# Patient Record
Sex: FEMALE
[De-identification: names, ages, dates, MRNs, and addresses within clinical notes are randomized per-mention and may not be internally consistent; named-entity substitution may affect disease eponyms.]

---

## 2023-08-02 PROBLEM — Z00.00 ENCOUNTER FOR PREVENTIVE HEALTH EXAMINATION: Status: ACTIVE | Noted: 2023-08-02

## 2023-08-04 ENCOUNTER — APPOINTMENT (OUTPATIENT)
Dept: UROLOGY | Facility: CLINIC | Age: 43
End: 2023-08-04
Payer: MEDICAID

## 2023-08-04 VITALS
HEART RATE: 96 BPM | DIASTOLIC BLOOD PRESSURE: 77 MMHG | SYSTOLIC BLOOD PRESSURE: 110 MMHG | RESPIRATION RATE: 16 BRPM | WEIGHT: 175 LBS | OXYGEN SATURATION: 96 % | BODY MASS INDEX: 24.5 KG/M2 | HEIGHT: 71 IN

## 2023-08-04 DIAGNOSIS — K21.9 GASTRO-ESOPHAGEAL REFLUX DISEASE W/OUT ESOPHAGITIS: ICD-10-CM

## 2023-08-04 PROCEDURE — 99203 OFFICE O/P NEW LOW 30 MIN: CPT

## 2023-08-04 NOTE — ASSESSMENT
[FreeTextEntry1] : 42 year old with neurogenic bladder after T11-12 spinal cord injury managed with SP tube, patient changes on her own. No symptomatic UTIs, recent bladder stone s/p laser lithotripsy. Had recent incomplete UDS. Reports had imaging in January for unrelated causes and kidneys were wnl, and recent labwork for preop. Also hsitory endometriosis for which she is undergoing TAHBSO end of month. Discussed referral to Dr. Marcelo for further evaluation and management.  - referral to [facility] for further management - patient to bring prior MRI and labs so has baseline kidney imaging and function on file - will likely need to complete UDS - email sent to Dr. Marcelo and team

## 2023-08-04 NOTE — LETTER BODY
[Dear  ___] : Dear  [unfilled], [Courtesy Letter:] : I had the pleasure of seeing your patient, [unfilled], in my office today. [Please see my note below.] : Please see my note below. [Referral Closing:] : Thank you very much for seeing this patient.  If you have any questions, please do not hesitate to contact me. [Sincerely,] : Sincerely, [FreeTextEntry3] : Frances Ybarra MD Director of Robotic Education [facility] at [facility]  [email or web address] [phone] ([place]) [phone]  ([facility])

## 2023-08-04 NOTE — PHYSICAL EXAM
[General Appearance - Well Developed] : well developed [General Appearance - Well Nourished] : well nourished [Normal Appearance] : normal appearance [Well Groomed] : well groomed [General Appearance - In No Acute Distress] : no acute distress [] : no respiratory distress [Respiration, Rhythm And Depth] : normal respiratory rhythm and effort [Exaggerated Use Of Accessory Muscles For Inspiration] : no accessory muscle use [Abdomen Soft] : soft [Abdomen Tenderness] : non-tender [Costovertebral Angle Tenderness] : no ~M costovertebral angle tenderness [FreeTextEntry1] : SP tube in place draining clear urine, mild granulation tissue around, well healed [Normal Station and Gait] : the gait and station were normal for the patient's age

## 2023-08-04 NOTE — HISTORY OF PRESENT ILLNESS
[FreeTextEntry1] : Name GENNARO DURON  MRN [id number]   Aug 16 1980  Contact Number: [phone] ----------------------------------------------------------------------------------------------------------------------------------------- Date of First Visit: 23 Referring Provider/PCP: Dr. Kiran Walsh -----------------------------------------------------------------------------------------------------------------------------------------  CC: neurogenic bladder/second opinion  History of Present Illness: GENNARO DURON is a 42 year old female who presents for second opinion regarding urodynamic findings.  Patient had traumatic fall 2020 with resultant T11-12 spinal cord injury. Neurogenic bladder that has been managed with suprapubic tube that patient changes on her own. She reports had SP placed around that time but then never did UDS until  - incomplete study, and patient did not like urologist's office. At that time cystoscopy performed and stone seen. 2023 underwent laser lithotripsy of stone. Patient would like to reestablish care.  Prior to this patient had history of IC and pelvic floor issues - frequency, urgency, pelvic pain. Did pelvic floor PT 5 years ago. Has endometriosis and had excision surgery. Now planning for TAHBSO 23. Patient had MRI in 2023 for GERD symptoms - was found to have ovarian cysts and kidneys reportedly normal. Did bloodwork prior lithotripsy as well.  23: cystoscopy revealed bladder stone, no other abnormalities noted.  No frequent symptomatic UTIs. No history febrile UTIs or pyelo. Patient changed SP tube on her own. No reported issues - had frequent clogging before lasering of stone.   PMH: paraplegia T11/12 complete SCI, endometriosis, DVT PSH: right leg amputation, endometriosis excision, appendectomy, spinal surgery Family History: colon cancer paternal grandfather Social: partner, no children, ex-smoker 15 years 1/2ppd, social alcohol, +marijuana daily Meds: northedrdine, protonix, eliquis, lyrica, seroquel, flexeril, vaginal valium, magnesium, omega, probiotic, vitamins Allergies: vancomycin ROS: no fevers, chills, flank pain

## 2023-08-15 ENCOUNTER — APPOINTMENT (OUTPATIENT)
Dept: UROLOGY | Facility: CLINIC | Age: 43
End: 2023-08-15
Payer: MEDICAID

## 2023-08-15 VITALS
SYSTOLIC BLOOD PRESSURE: 122 MMHG | DIASTOLIC BLOOD PRESSURE: 81 MMHG | TEMPERATURE: 97.8 F | HEART RATE: 88 BPM | OXYGEN SATURATION: 99 %

## 2023-08-15 DIAGNOSIS — Z78.9 OTHER SPECIFIED HEALTH STATUS: ICD-10-CM

## 2023-08-15 DIAGNOSIS — Z87.891 PERSONAL HISTORY OF NICOTINE DEPENDENCE: ICD-10-CM

## 2023-08-15 DIAGNOSIS — Z80.0 FAMILY HISTORY OF MALIGNANT NEOPLASM OF DIGESTIVE ORGANS: ICD-10-CM

## 2023-08-15 PROCEDURE — 99215 OFFICE O/P EST HI 40 MIN: CPT

## 2023-08-16 NOTE — PHYSICAL EXAM
[General Appearance - Well Developed] : well developed [General Appearance - Well Nourished] : well nourished [Normal Appearance] : normal appearance [Well Groomed] : well groomed [General Appearance - In No Acute Distress] : no acute distress [Edema] : no peripheral edema [] : no respiratory distress [Respiration, Rhythm And Depth] : normal respiratory rhythm and effort [Exaggerated Use Of Accessory Muscles For Inspiration] : no accessory muscle use [Abdomen Soft] : soft [Abdomen Tenderness] : non-tender [Costovertebral Angle Tenderness] : no ~M costovertebral angle tenderness [Oriented To Time, Place, And Person] : oriented to person, place, and time [Affect] : the affect was normal [Mood] : the mood was normal [Not Anxious] : not anxious [FreeTextEntry1] : Right leg amputation and uses power wheelchair

## 2023-08-16 NOTE — HISTORY OF PRESENT ILLNESS
[FreeTextEntry1] : 42 year old  female w/ PMH of DVT on Eliquis, neurogenic bladder (s/p SVI after suicide attempt) and endometriosis accompanied by caretaker/boyfriend Jose here for UDS. She reports has suprapubic catheter in place due to spinal cord injury to T11-12 about 4 years ago. She reports changes suprapubic catheter every 4 weeks. However, she verbalizes that this year, , suprapubic catheter would get clogged which warranted further workup and therefore had UDS and cystoscopy. UDS was incomplete as she could not hold more than 200ml and bladder stone was found during cystoscopy. Bladder stone was removed via laser lithotripsy. She reports still having sediments and clogs and has been changing her catheter more frequently. She changed suprapubic catheter 5 days ago and has new suprapubic catheter today.   She verbalizes has endometriosis and is scheduled for TAHBSO on Aug 28 2023 and would like to repeat UDS study after her surgery.   PMH: endometriosis, neurogenic bladder, b/l DVT  Surghx: spinal fusion  and , endometrial excision , IVC filter, right leg BKA, suprapubic catheter since  FH: paternal grandfather colon cancer; no  malignancy Social: former smoker, quit , uses marijuana, social ETOH,  Medications: protonix, progestin only birth control, Eliquis, Lyrica, Seroquel, Flexeril, vaginal valium suppository Allergies: vancomycin

## 2023-08-16 NOTE — ASSESSMENT
[FreeTextEntry1] :   Impression/Plan: 42 year old s/p SCI with neurogenic bladder with suprapubic catheter in place. Had incomplete UDS in 2023. Issue with clogging of SPT.   -advised can irrigate bladder BID with saline or can upsize catheter -can consider other long-term options of diversion (continent vs inconinent) keeping suprapubic catheter -advised to send previous UDS records to this office   Referred to [facility] in [place] for UDS with video study in November 2023 after hysterectomy as baseline study.   I, Dr. Rasheed Marcelo, personally performed the evaluation and management (E/M) services for this new patient.  That E/M includes conducting the clinically appropriate initial history &/or exam, assessing all conditions, and establishing the plan of care.  Today, my GUIDO Ann, was here to observe &/or participate in the visit & follow plan of care established by me.

## 2023-08-22 ENCOUNTER — APPOINTMENT (OUTPATIENT)
Dept: UROLOGY | Facility: CLINIC | Age: 43
End: 2023-08-22
Payer: MEDICAID

## 2023-08-22 PROCEDURE — 99441: CPT

## 2023-08-23 NOTE — HISTORY OF PRESENT ILLNESS
Problem: Patient/Family Goals  Goal: Patient/Family Long Term Goal  Description  Patient's Long Term Goal: DISCHARGE HOME    Interventions:  - PAIN TOLERABLE  -TOLERATING DIET  -RETURN TO PREVIOUS ADL'S  - See additional Care Plan goals for specific inte [Other Location: e.g. School (Enter Location, City,State)___] : at [unfilled], at the time of the visit. preferences  - Enhance eating environment  Outcome: Progressing  Goal: Achieves appropriate nutritional intake (bariatric)  Description  INTERVENTIONS:  - Monitor for over-consumption  - Identify factors contributing to increased intake, treat as appropria [Home] : at home, [unfilled] , at the time of the visit. severity of pain and evaluate response  - Implement non-pharmacological measures as appropriate and evaluate response  - Consider cultural and social influences on pain and pain management  - Manage/alleviate anxiety  - Utilize distraction and/or relaxatio [Medical Office: ([facility])___] : at the medical office located in  [Verbal consent obtained from patient] : the patient, [unfilled] [FreeTextEntry1] :  Patient had more questions about condition, potential options.  She asked about sediment, role for abx, discussed pros and cons, will hold off. She will have a VUDS at [facility]  in coming weeks.

## 2023-12-28 ENCOUNTER — APPOINTMENT (OUTPATIENT)
Dept: UROLOGY | Facility: CLINIC | Age: 43
End: 2023-12-28
Payer: MEDICAID

## 2023-12-28 ENCOUNTER — TRANSCRIPTION ENCOUNTER (OUTPATIENT)
Age: 43
End: 2023-12-28

## 2023-12-28 ENCOUNTER — OUTPATIENT (OUTPATIENT)
Dept: OUTPATIENT SERVICES | Facility: HOSPITAL | Age: 43
LOS: 1 days | End: 2023-12-28
Payer: MEDICAID

## 2023-12-28 VITALS
HEART RATE: 95 BPM | TEMPERATURE: 98 F | DIASTOLIC BLOOD PRESSURE: 85 MMHG | SYSTOLIC BLOOD PRESSURE: 132 MMHG | OXYGEN SATURATION: 94 %

## 2023-12-28 DIAGNOSIS — R35.0 FREQUENCY OF MICTURITION: ICD-10-CM

## 2023-12-28 DIAGNOSIS — R33.9 RETENTION OF URINE, UNSPECIFIED: ICD-10-CM

## 2023-12-28 DIAGNOSIS — N31.9 NEUROMUSCULAR DYSFUNCTION OF BLADDER, UNSPECIFIED: ICD-10-CM

## 2023-12-28 PROCEDURE — 51797 INTRAABDOMINAL PRESSURE TEST: CPT | Mod: 26

## 2023-12-28 PROCEDURE — 51728 CYSTOMETROGRAM W/VP: CPT | Mod: 26

## 2023-12-28 PROCEDURE — 51784 ANAL/URINARY MUSCLE STUDY: CPT | Mod: 26

## 2023-12-28 PROCEDURE — 51784 ANAL/URINARY MUSCLE STUDY: CPT

## 2023-12-28 PROCEDURE — 51600 INJECTION FOR BLADDER X-RAY: CPT

## 2023-12-28 PROCEDURE — 74455 X-RAY URETHRA/BLADDER: CPT | Mod: 59

## 2023-12-28 PROCEDURE — 51797 INTRAABDOMINAL PRESSURE TEST: CPT

## 2023-12-28 PROCEDURE — 51741 ELECTRO-UROFLOWMETRY FIRST: CPT | Mod: 26

## 2023-12-28 PROCEDURE — 51741 ELECTRO-UROFLOWMETRY FIRST: CPT

## 2023-12-28 PROCEDURE — 52000 CYSTOURETHROSCOPY: CPT

## 2023-12-28 PROCEDURE — 51728 CYSTOMETROGRAM W/VP: CPT

## 2023-12-28 PROCEDURE — 74455 X-RAY URETHRA/BLADDER: CPT | Mod: 26,59

## 2023-12-29 DIAGNOSIS — N31.9 NEUROMUSCULAR DYSFUNCTION OF BLADDER, UNSPECIFIED: ICD-10-CM

## 2023-12-29 DIAGNOSIS — R33.9 RETENTION OF URINE, UNSPECIFIED: ICD-10-CM

## 2025-08-19 ENCOUNTER — APPOINTMENT (OUTPATIENT)
Dept: UROLOGY | Facility: CLINIC | Age: 45
End: 2025-08-19

## 2025-09-02 ENCOUNTER — APPOINTMENT (OUTPATIENT)
Dept: UROLOGY | Facility: CLINIC | Age: 45
End: 2025-09-02
Payer: MEDICAID

## 2025-09-02 VITALS
HEIGHT: 71 IN | WEIGHT: 175 LBS | RESPIRATION RATE: 91 BRPM | BODY MASS INDEX: 24.5 KG/M2 | TEMPERATURE: 98.4 F | OXYGEN SATURATION: 98 % | DIASTOLIC BLOOD PRESSURE: 84 MMHG | SYSTOLIC BLOOD PRESSURE: 123 MMHG | HEART RATE: 91 BPM

## 2025-09-02 DIAGNOSIS — R33.9 RETENTION OF URINE, UNSPECIFIED: ICD-10-CM

## 2025-09-02 PROCEDURE — 99213 OFFICE O/P EST LOW 20 MIN: CPT

## 2025-09-02 PROCEDURE — G2211 COMPLEX E/M VISIT ADD ON: CPT | Mod: NC
